# Patient Record
Sex: FEMALE | Employment: OTHER | URBAN - METROPOLITAN AREA
[De-identification: names, ages, dates, MRNs, and addresses within clinical notes are randomized per-mention and may not be internally consistent; named-entity substitution may affect disease eponyms.]

---

## 2017-12-27 PROBLEM — G44.86 CERVICOGENIC HEADACHE: Status: ACTIVE | Noted: 2017-12-27

## 2017-12-27 PROBLEM — G44.209 TENSION HEADACHE: Status: ACTIVE | Noted: 2017-12-27

## 2018-09-21 PROCEDURE — 84165 PROTEIN E-PHORESIS SERUM: CPT | Performed by: OTHER

## 2018-09-21 PROCEDURE — 82784 ASSAY IGA/IGD/IGG/IGM EACH: CPT | Performed by: OTHER

## 2018-09-21 PROCEDURE — 84207 ASSAY OF VITAMIN B-6: CPT | Performed by: OTHER

## 2018-09-21 PROCEDURE — 83516 IMMUNOASSAY NONANTIBODY: CPT | Performed by: OTHER

## 2018-09-21 PROCEDURE — 86334 IMMUNOFIX E-PHORESIS SERUM: CPT | Performed by: OTHER

## 2018-09-21 PROCEDURE — 86256 FLUORESCENT ANTIBODY TITER: CPT | Performed by: OTHER

## 2018-09-21 PROCEDURE — 86618 LYME DISEASE ANTIBODY: CPT | Performed by: OTHER

## 2018-09-21 PROCEDURE — 83883 ASSAY NEPHELOMETRY NOT SPEC: CPT | Performed by: OTHER

## 2019-01-15 PROBLEM — R51.9 CHRONIC LEFT-SIDED HEADACHES: Status: ACTIVE | Noted: 2019-01-15

## 2019-01-15 PROBLEM — G89.29 CHRONIC LEFT-SIDED HEADACHES: Status: ACTIVE | Noted: 2019-01-15

## 2019-01-15 PROBLEM — M50.30 DEGENERATION OF CERVICAL INTERVERTEBRAL DISC: Status: ACTIVE | Noted: 2019-01-15

## 2019-01-15 PROBLEM — M47.812 CERVICAL SPONDYLOSIS WITHOUT MYELOPATHY: Status: ACTIVE | Noted: 2019-01-15

## 2020-08-19 ENCOUNTER — OFFICE VISIT (OUTPATIENT)
Dept: OBGYN CLINIC | Facility: CLINIC | Age: 75
End: 2020-08-19
Payer: COMMERCIAL

## 2020-08-19 VITALS
WEIGHT: 130 LBS | DIASTOLIC BLOOD PRESSURE: 62 MMHG | HEIGHT: 64 IN | SYSTOLIC BLOOD PRESSURE: 112 MMHG | BODY MASS INDEX: 22.2 KG/M2

## 2020-08-19 DIAGNOSIS — Z12.31 SCREENING MAMMOGRAM, ENCOUNTER FOR: ICD-10-CM

## 2020-08-19 DIAGNOSIS — N95.2 POST-MENOPAUSAL ATROPHIC VAGINITIS: ICD-10-CM

## 2020-08-19 DIAGNOSIS — Z01.419 WOMEN'S ANNUAL ROUTINE GYNECOLOGICAL EXAMINATION: Primary | ICD-10-CM

## 2020-08-19 PROCEDURE — 3078F DIAST BP <80 MM HG: CPT | Performed by: OBSTETRICS & GYNECOLOGY

## 2020-08-19 PROCEDURE — G0101 CA SCREEN;PELVIC/BREAST EXAM: HCPCS | Performed by: OBSTETRICS & GYNECOLOGY

## 2020-08-19 PROCEDURE — 99202 OFFICE O/P NEW SF 15 MIN: CPT | Performed by: OBSTETRICS & GYNECOLOGY

## 2020-08-19 PROCEDURE — 3074F SYST BP LT 130 MM HG: CPT | Performed by: OBSTETRICS & GYNECOLOGY

## 2020-08-19 PROCEDURE — 3008F BODY MASS INDEX DOCD: CPT | Performed by: OBSTETRICS & GYNECOLOGY

## 2020-08-19 RX ORDER — DOXYCYCLINE HYCLATE 100 MG
TABLET ORAL 2 TIMES DAILY
COMMUNITY
Start: 2020-04-02

## 2020-08-19 RX ORDER — OMEPRAZOLE 20 MG/1
CAPSULE, DELAYED RELEASE ORAL
COMMUNITY
Start: 2020-08-18

## 2020-08-19 NOTE — PROGRESS NOTES
Annual Gyn Exam    HPI patient is new to my practice and is a transfer from Dr. Yang Torres.   She is for a pelvic and breast exam.  She has postmenopausal vaginal atrophy and uses topical vaginal estrogen cream.    OB History    Para Term  Ludwig Morrell MOUTH TWICE DAILY, Disp: 180 tablet, Rfl: 0  •  DIVALPROEX SODIUM  MG Oral Tablet 24 Hr, TAKE 1 TABLET(250 MG) BY MOUTH DAILY, Disp: 90 tablet, Rfl: 0  •  Cholecalciferol (VITAMIN D3) 77501 units Oral Cap, Take by mouth., Disp: , Rfl:   •  Fluticason tenderness. Genitourinary:    Vagina and uterus normal.   Rectum:      No external hemorrhoid. There is no rash, tenderness, lesion or injury on the right labia. There is no rash, tenderness, lesion or injury on the left labia.  Cervix exhibits no motio [id number] MD, MD

## 2020-08-27 ENCOUNTER — TELEPHONE (OUTPATIENT)
Dept: OBGYN CLINIC | Facility: CLINIC | Age: 75
End: 2020-08-27

## 2020-08-27 NOTE — TELEPHONE ENCOUNTER
Did not see any records. Called pt to inform to try and refax to [phone].  [facility]